# Patient Record
Sex: MALE | NOT HISPANIC OR LATINO
[De-identification: names, ages, dates, MRNs, and addresses within clinical notes are randomized per-mention and may not be internally consistent; named-entity substitution may affect disease eponyms.]

---

## 2017-03-16 PROBLEM — Z00.00 ENCOUNTER FOR PREVENTIVE HEALTH EXAMINATION: Status: ACTIVE | Noted: 2017-03-16

## 2020-01-30 ENCOUNTER — LABORATORY RESULT (OUTPATIENT)
Age: 30
End: 2020-01-30

## 2020-01-30 ENCOUNTER — APPOINTMENT (OUTPATIENT)
Dept: NEUROLOGY | Facility: CLINIC | Age: 30
End: 2020-01-30
Payer: COMMERCIAL

## 2020-01-30 VITALS
SYSTOLIC BLOOD PRESSURE: 128 MMHG | WEIGHT: 145 LBS | HEART RATE: 63 BPM | OXYGEN SATURATION: 97 % | HEIGHT: 67 IN | DIASTOLIC BLOOD PRESSURE: 72 MMHG | TEMPERATURE: 97.9 F | BODY MASS INDEX: 22.76 KG/M2

## 2020-01-30 PROCEDURE — 99205 OFFICE O/P NEW HI 60 MIN: CPT

## 2020-01-30 NOTE — DATA REVIEWED
[de-identified] : MRI Brain without contrast (1/18/2019): \par 1. 1.3 cm CC by 3.3 cm AP by 2.0 cm transverse dimension, nonspecific, heterogeneous enhancing I paramedian frontal diploic space expansile mass with significant thinning and bony remodeling of the outer and to a lesser extent inner table bone without definite intracranial extension.\par Interval follow-up CT head exam is suggested for further evaluation, as warranted clinically.\par 2. Otherwise unremarkable MRI of the brain.\par \par MRA Head without contrast: Probable minute infundibulum at the right A1-2 junction. Suggest continued MR angiographic surveillance to confirm stability.\par MRA Neck without contrast: Unremarkable.\par  [de-identified] : Labs (12/23/2019): \par CBC, CMP - WNL\par Lipid profile: , HDL 46, Triglycerides 60, Total 205\par TSH 1.93

## 2020-01-30 NOTE — ASSESSMENT
[FreeTextEntry1] : 29 year-old RH male referred by Dr. Mendieta for evaluation of transient neurological symptoms that aren't conclusive for TIA/CVA.  Lower probability since he has almost no stroke risk factors.  Could it be other etiologies of acute encephalopathy including seizure, multiple sclerosis, medications?  His neurological exam is intact.\par \par Discussed anatomy of patent foramen ovale (PFO) and the recent studies in regard to closure in respect to stroke.\par The spots in his eyes don't correlate well with stroke but would be related directly to his eyes or possibly migraine auras.\par \par Plan:\par 1) Ordered hypercoagulable workup to rule out underlying clotting risk.\par 2) Recommended that he complete at least 30 day holter monitor to rule out atrial fibrillation.\par If both are negative, then can consider closure of the PFO if suspicion for stroke is strong.  Would appreciate collateral information regarding the PFO including if there was an atrial septal aneurysm as well.\par Otherwise, can consider continued observation now that he's been educated on stroke signs and symptoms.\par \par - Due to his underlying acid reflux, didn't recommend starting antiplatelet at this time.  Reevaluate after above results.\par - Agree with continued dietary changes for his cholesterol level with repeat lipid profile in 3 months to assess response.\par - Encouraged smoking cessation including cigars.\par \par In terms of other possible diagnoses, any consideration for extended EEG to rule out seizure focus.  I think he should be formally evaluated by Ophthalmologist for his transient vision changes.\par \par Thank you for allowing me to participate in the care of your patient.  If you have any questions, please feel free to call me at [phone].

## 2020-01-30 NOTE — HISTORY OF PRESENT ILLNESS
[FreeTextEntry1] : 29 year-old RH male referred by Dr. Mendieta for evaluation of transient neurological symptoms.\par \par 1) Summer, 2018 - He experienced blurry vision with trouble seeing the screen, possibly double vision, left sided numbness, gibberish typing on his phone that resolved within 20 minutes followed by slightly altered sensorium.  He was evaluated at [facility] and other doctors with possible diagnosis of TIA though workup including MRI Brain, echocardiogram were negative.  No medications were started.\par \par 2) A few months ago, he felt disoriented over a full day with severe migraines.  He was sleeping intermittently and unable to assimilate information.  He went to ER with diagnosis of alcohol withdrawal.  He had doubts so had further workup with Cardiologist - showed PFO with valsalva only.  He saw Neurologist, Dr. Mendieta, with MRI and MRA Head and Neck that were essentially negative.  Dr. Mendieta referred him for further evaluation.\par \par Besides the above events, he thinks that sometimes feels out of it but no pattern.  His hands can feel numb and he can experience general weakness but no specific right-sided or left-sided symptoms.  Some palpitations.  Some white or black dots in his vision but no vision loss.\par \par Stroke risk factors - \par Hyperlipidemia - He's done some lifestyle changes.\par No HTN, DM, heart disease.\par Smokes cigar every 2-3 weeks, no cigarette smoking\par No family history of MI or CVA.

## 2020-01-30 NOTE — CONSULT LETTER
[Consult Letter:] : I had the pleasure of evaluating your patient, [unfilled]. [Dear  ___] : Dear  [unfilled], [FreeTextEntry2] : Meera Mendieta MD\par [address]\[place] [address]

## 2020-02-04 LAB
ACE BLD-CCNC: 27 U/L
ANACR T: NEGATIVE
AT III PPP CHRO-ACNC: 123 %
B2 GLYCOPROT1 AB SER QL: POSITIVE
CARDIOLIPIN AB SER IA-ACNC: NEGATIVE
CONFIRM: 34.3 SEC
CRP SERPL-MCNC: 0.3 MG/DL
DNA PLOIDY SPEC FC-IMP: NORMAL
DRVVT IMM 1:2 NP PPP: NORMAL
DRVVT SCREEN TO CONFIRM RATIO: 1.1 RATIO
DSDNA AB SER-ACNC: <12 IU/ML
ENA SS-A AB SER IA-ACNC: <0.2 AL
ENA SS-B AB SER IA-ACNC: <0.2 AL
ERYTHROCYTE [SEDIMENTATION RATE] IN BLOOD BY WESTERGREN METHOD: 9 MM/HR
HCYS SERPL-MCNC: 10.3 UMOL/L
MPO AB + PR3 PNL SER: NORMAL
PROT C PPP CHRO-ACNC: 116 %
PROT S AG ACT/NOR PPP IA: 100 %
PROT S FREE PPP-ACNC: 119 % NORMAL
PTR INTERP: NORMAL
SCREEN DRVVT: 41.8 SEC

## 2020-02-14 ENCOUNTER — TRANSCRIPTION ENCOUNTER (OUTPATIENT)
Age: 30
End: 2020-02-14

## 2020-02-14 ENCOUNTER — APPOINTMENT (OUTPATIENT)
Dept: NEUROLOGY | Facility: CLINIC | Age: 30
End: 2020-02-14
Payer: COMMERCIAL

## 2020-02-14 VITALS
HEIGHT: 67 IN | WEIGHT: 145 LBS | SYSTOLIC BLOOD PRESSURE: 144 MMHG | HEART RATE: 80 BPM | BODY MASS INDEX: 22.76 KG/M2 | TEMPERATURE: 98.2 F | OXYGEN SATURATION: 98 % | DIASTOLIC BLOOD PRESSURE: 82 MMHG

## 2020-02-14 DIAGNOSIS — Z78.9 OTHER SPECIFIED HEALTH STATUS: ICD-10-CM

## 2020-02-14 DIAGNOSIS — Q21.1 ATRIAL SEPTAL DEFECT: ICD-10-CM

## 2020-02-14 DIAGNOSIS — R29.818 OTHER SYMPTOMS AND SIGNS INVOLVING THE NERVOUS SYSTEM: ICD-10-CM

## 2020-02-14 DIAGNOSIS — E78.49 OTHER HYPERLIPIDEMIA: ICD-10-CM

## 2020-02-14 PROCEDURE — 99214 OFFICE O/P EST MOD 30 MIN: CPT

## 2020-02-14 NOTE — DATA REVIEWED
[de-identified] : Hypercoagulable workup negative except for beta-2 glycoprotein screen - Positive but IgA Ab 7, IgG Ab <5, IgM Ab 9.2 - all less than 20 so negative

## 2020-02-14 NOTE — HISTORY OF PRESENT ILLNESS
[FreeTextEntry1] : 29 year-old RH male presents for follow up of transient neurological symptoms and review of recent lab work.  He hasn't had any repeated episodes of weakness, numbness, speech or visual deficits.  No major headaches.  Still unclear diagnosis, low workups have been negative for TIA/CVA, though it did show PFO with valsalva only.  \par \par He had the lab work on January 30th and presents for review with concern for positive study.\par He hasn't made the appointment for holter monitor yet.\par \par Stroke risk factors - \par Hyperlipidemia - His Cardiologist told him that it's a little high.  He's adjusted his diet to decrease alcohol intake.  \par No HTN, DM, heart disease.\par Smokes cigar every 2-3 weeks, no cigarette smoking\par No family history of MI or CVA.

## 2020-02-14 NOTE — PHYSICAL EXAM
[FreeTextEntry1] : General: sitting on exam table comfortably, NAD\par Eyes: anicteric sclera\par CV: RRR	\par Carotid: No bruits\par Extremities: FROMx4, 2+ radial pulses bilaterally\par 			\par Neurological exam:	\par Mental status: AA&O x 3, fluent, spontaneous speech, no dysarthria, follows complex commands across midline, able give full history of present and past events, memory intact, normal attention span, fund of knowledge appropriate\par Cranial nerves: EOMI, VFF, facial sensation intact, no facial droop, tongue midline\par Motor: No pronator drift, 5/5 x4, normal tone and bulk\par Sensory: Intact light touch bilaterally. Negative Romberg\par Reflexes: symmetric\par Cerebellar: FNF intact bilaterally\par Gait: steady

## 2020-02-14 NOTE — ASSESSMENT
[FreeTextEntry1] : 29 year-old RH male  referred by Dr. Mendieta for evaluation of transient neurological symptoms presents for foloow up and review of lab work.  His neurological exam remains intact.\par \par Discussed the results of his hypercoagulable workup that was negative including the beta-2 glycoprotein since the individual antibody values were negative.  Plan is for repeat lab work to re-document negative value.\par \par Meanwhile, I encouraged him to have the 30-day holter monitor to rule out atrial fibrillation.  Will also repeat lipid profile to evaluate the effect of his lifestyle changes.\par \par If above workup remains negative, then can re-discuss role of PFO closure.  Would still appreciate collateral information regarding the PFO including if there was an atrial septal aneurysm as well.  The PFO can still be observed since not clear stroke history but he is very interested in having the procedure so will discuss after further testing.  \par \par Due to his undefined diagnosis and underlying acid reflux, didn't recommend starting antiplatelet at this time.

## 2020-03-16 LAB
B2 MICROGLOB SERPL-MCNC: 1 MG/L
CHOLEST SERPL-MCNC: 171 MG/DL
CHOLEST/HDLC SERPL: 3.5 RATIO
HDLC SERPL-MCNC: 49 MG/DL
LDLC SERPL CALC-MCNC: 96 MG/DL
TRIGL SERPL-MCNC: 127 MG/DL
VWF MULTIMERS PPP IA-ACNC: NORMAL

## 2020-03-23 ENCOUNTER — APPOINTMENT (OUTPATIENT)
Dept: NEUROLOGY | Facility: CLINIC | Age: 30
End: 2020-03-23